# Patient Record
Sex: MALE | Race: WHITE | NOT HISPANIC OR LATINO | URBAN - METROPOLITAN AREA
[De-identification: names, ages, dates, MRNs, and addresses within clinical notes are randomized per-mention and may not be internally consistent; named-entity substitution may affect disease eponyms.]

---

## 2023-09-20 PROBLEM — Z82.49 FAMILY HISTORY OF HEART DISEASE: Status: ACTIVE | Noted: 2023-09-20

## 2023-09-20 NOTE — PROGRESS NOTES
[facility] Cardiology  Consultation H&P  Camilo Mcgowan  1970  [address]  [facility] [address]     VISIT DATE:  09/21/23    PCP: Rolf Thomas MD  [address]  [facility] [address]    IDENTIFICATION: A 52 y.o. male      PROBLEM LIST:  CAD    2016 GXT at FPA 12 mins wnl   HTN  HLD  272/249/43/186 - 10/5/2022  163/128/46/97 - 3/2/2023  GERD  Depression  ED  Surgical history:   Rhinoplasty    CC:  Chief Complaint   Patient presents with    Family Hx of Heart Disease       Allergies  Allergies   Allergen Reactions    Trazodone Unknown - High Severity       Current Medications  Current Outpatient Medications   Medication Instructions    escitalopram (LEXAPRO) 30 mg, Oral, Daily    ezetimibe (ZETIA) 10 MG tablet Oral, Daily    lisinopril (PRINIVIL,ZESTRIL) 40 MG tablet 1 tablet, Oral, Daily    rosuvastatin (CRESTOR) 10 MG tablet 1 tablet, Oral, Daily        History of Present Illness   HPI  Camilo Mcgowan is a 52 y.o. year old male with the above mentioned PMH who presents for consult from Rolf Thomas MD for evaluation of cardiac disease with a strong family history of precocious atherosclerosis.  Patient states his vigorously active and exercise on regular basis.  He does note most recent increase in dyspnea.  He has had upper respiratory illness of late as well.  He is 1 of 9 children many of which have had precocious atherosclerosis.      Pt denies any overt chest pain, orthopnea, PND, palpitations, lower extremity edema, or claudication. Pt denies history of CHF, DVT, PE, MI, CVA, TIA, or rheumatic fever.       ROS  Review of Systems   Cardiovascular:  Positive for dyspnea on exertion.   All other systems reviewed and are negative.    SOCIAL HX  Social History     Socioeconomic History    Marital status: Unknown   Tobacco Use    Smoking status: Never    Smokeless tobacco: Never   Vaping Use    Vaping Use: Never  "used   Substance and Sexual Activity    Alcohol use: Yes     Alcohol/week: 5.0 standard drinks     Types: 5 Shots of liquor per week    Drug use: Defer    Sexual activity: Defer       FAMILY HX  Family History   Problem Relation Age of Onset    Heart disease Mother     Heart attack Mother     Heart disease Father     No Known Problems Sister     Heart disease Brother         pt stated brother had Triple bypass       Vitals:    09/21/23 0909   BP: 136/84   BP Location: Right arm   Patient Position: Sitting   Cuff Size: Adult   Pulse: 72   SpO2: 95%   Weight: 105 kg (231 lb)   Height: 182.9 cm (72\")     Body mass index is 31.33 kg/m².     PHYSICAL EXAMINATION:  Constitutional:       Appearance: Healthy appearance. Not in distress.   Neck:      Vascular: No JVR. JVD normal.   Pulmonary:      Effort: Pulmonary effort is normal.      Breath sounds: Normal breath sounds. No wheezing. No rhonchi. No rales.   Chest:      Chest wall: Not tender to palpatation.   Cardiovascular:      PMI at left midclavicular line. Normal rate. Regular rhythm. Normal S1. Normal S2.       Murmurs: There is no murmur.      No gallop.  No click. No rub.   Pulses:     Intact distal pulses.   Edema:     Peripheral edema absent.   Abdominal:      General: Bowel sounds are normal.      Palpations: Abdomen is soft.      Tenderness: There is no abdominal tenderness.   Musculoskeletal: Normal range of motion.         General: No tenderness. Skin:     General: Skin is warm and dry.   Neurological:      General: No focal deficit present.      Mental Status: Alert and oriented to person, place and time.       Diagnostic Data:    3/2/2023  CBC: WNL  CMP: WNL  TSH: 2.03      ECG 12 Lead    Date/Time: 9/21/2023 9:32 AM  Performed by: Irineo Guthrie MD  Authorized by: Irineo Guthrie MD   Previous ECG: no previous ECG available  Rhythm: sinus rhythm  BPM: 72    Clinical impression: non-specific ECG                 ASSESSMENT:   Diagnosis Plan   1. Family " history of heart disease        2. NGO (dyspnea on exertion)        3. Primary hypertension        4. Mixed hyperlipidemia            PLAN:  Dyspnea on exertion with marked family history Procosa sclerosis and dyslipidemia hypertensive male restratification with exercise Cardiolite    Hypertension we will follow blood pressure response to exercise on lisinopril    Mixed dyslipidemia on dual agent at current would consider PCSK9 inhibitor and document lipoprotein a        Rolf Thomas MD, thank you for referring Mr. Mcgowan for evaluation.  I have forwarded my electronically generated recommendations to you for review.  Please do not hesitate to call with any questions.      Irineo Guthrie MD, FACC

## 2023-09-21 ENCOUNTER — OFFICE VISIT (OUTPATIENT)
Dept: CARDIOLOGY | Facility: CLINIC | Age: 53
End: 2023-09-21
Payer: COMMERCIAL

## 2023-09-21 VITALS
HEIGHT: 72 IN | WEIGHT: 231 LBS | SYSTOLIC BLOOD PRESSURE: 136 MMHG | BODY MASS INDEX: 31.29 KG/M2 | OXYGEN SATURATION: 95 % | DIASTOLIC BLOOD PRESSURE: 84 MMHG | HEART RATE: 72 BPM

## 2023-09-21 DIAGNOSIS — I10 PRIMARY HYPERTENSION: ICD-10-CM

## 2023-09-21 DIAGNOSIS — Z82.49 FAMILY HISTORY OF HEART DISEASE: Primary | ICD-10-CM

## 2023-09-21 DIAGNOSIS — E78.2 MIXED HYPERLIPIDEMIA: ICD-10-CM

## 2023-09-21 DIAGNOSIS — R06.09 DOE (DYSPNEA ON EXERTION): ICD-10-CM

## 2023-09-21 RX ORDER — LISINOPRIL 40 MG/1
1 TABLET ORAL DAILY
COMMUNITY
Start: 2023-09-12

## 2023-09-21 RX ORDER — ROSUVASTATIN CALCIUM 10 MG/1
1 TABLET, COATED ORAL DAILY
COMMUNITY
Start: 2023-07-29

## 2023-09-21 RX ORDER — ESCITALOPRAM OXALATE 20 MG/1
30 TABLET ORAL DAILY
COMMUNITY

## 2023-09-21 RX ORDER — EZETIMIBE 10 MG/1
TABLET ORAL DAILY
COMMUNITY
Start: 2023-07-05